# Patient Record
Sex: MALE | Race: ASIAN | NOT HISPANIC OR LATINO | URBAN - METROPOLITAN AREA
[De-identification: names, ages, dates, MRNs, and addresses within clinical notes are randomized per-mention and may not be internally consistent; named-entity substitution may affect disease eponyms.]

---

## 2018-08-01 ENCOUNTER — INPATIENT (INPATIENT)
Facility: HOSPITAL | Age: 68
LOS: 0 days | Discharge: HOME | DRG: 313 | End: 2018-08-02
Attending: INTERNAL MEDICINE | Admitting: INTERNAL MEDICINE
Payer: MEDICARE

## 2018-08-01 VITALS
OXYGEN SATURATION: 98 % | HEART RATE: 66 BPM | DIASTOLIC BLOOD PRESSURE: 61 MMHG | SYSTOLIC BLOOD PRESSURE: 108 MMHG | RESPIRATION RATE: 19 BRPM | WEIGHT: 179.9 LBS | TEMPERATURE: 98 F | HEIGHT: 67 IN

## 2018-08-01 DIAGNOSIS — F17.200 NICOTINE DEPENDENCE, UNSPECIFIED, UNCOMPLICATED: ICD-10-CM

## 2018-08-01 DIAGNOSIS — I24.9 ACUTE ISCHEMIC HEART DISEASE, UNSPECIFIED: ICD-10-CM

## 2018-08-01 DIAGNOSIS — Z29.9 ENCOUNTER FOR PROPHYLACTIC MEASURES, UNSPECIFIED: ICD-10-CM

## 2018-08-01 DIAGNOSIS — E11.9 TYPE 2 DIABETES MELLITUS WITHOUT COMPLICATIONS: ICD-10-CM

## 2018-08-01 DIAGNOSIS — I10 ESSENTIAL (PRIMARY) HYPERTENSION: ICD-10-CM

## 2018-08-01 LAB
ALBUMIN SERPL ELPH-MCNC: 3.9 G/DL — SIGNIFICANT CHANGE UP (ref 3.5–5)
ALP SERPL-CCNC: 68 U/L — SIGNIFICANT CHANGE UP (ref 40–120)
ALT FLD-CCNC: 59 U/L DA — SIGNIFICANT CHANGE UP (ref 10–60)
ANION GAP SERPL CALC-SCNC: 9 MMOL/L — SIGNIFICANT CHANGE UP (ref 5–17)
APTT BLD: 27.2 SEC — LOW (ref 27.5–37.4)
AST SERPL-CCNC: 30 U/L — SIGNIFICANT CHANGE UP (ref 10–40)
BASOPHILS # BLD AUTO: 0.1 K/UL — SIGNIFICANT CHANGE UP (ref 0–0.2)
BASOPHILS NFR BLD AUTO: 1 % — SIGNIFICANT CHANGE UP (ref 0–2)
BILIRUB SERPL-MCNC: 0.4 MG/DL — SIGNIFICANT CHANGE UP (ref 0.2–1.2)
BUN SERPL-MCNC: 13 MG/DL — SIGNIFICANT CHANGE UP (ref 7–18)
CALCIUM SERPL-MCNC: 8.8 MG/DL — SIGNIFICANT CHANGE UP (ref 8.4–10.5)
CHLORIDE SERPL-SCNC: 106 MMOL/L — SIGNIFICANT CHANGE UP (ref 96–108)
CK MB BLD-MCNC: 1 % — SIGNIFICANT CHANGE UP (ref 0–3.5)
CK MB BLD-MCNC: <1 % — SIGNIFICANT CHANGE UP (ref 0–3.5)
CK MB CFR SERPL CALC: 1.1 NG/ML — SIGNIFICANT CHANGE UP (ref 0–3.6)
CK MB CFR SERPL CALC: <1 NG/ML — SIGNIFICANT CHANGE UP (ref 0–3.6)
CK SERPL-CCNC: 108 U/L — SIGNIFICANT CHANGE UP (ref 35–232)
CK SERPL-CCNC: 96 U/L — SIGNIFICANT CHANGE UP (ref 35–232)
CO2 SERPL-SCNC: 24 MMOL/L — SIGNIFICANT CHANGE UP (ref 22–31)
CREAT SERPL-MCNC: 0.94 MG/DL — SIGNIFICANT CHANGE UP (ref 0.5–1.3)
EOSINOPHIL # BLD AUTO: 0.1 K/UL — SIGNIFICANT CHANGE UP (ref 0–0.5)
EOSINOPHIL NFR BLD AUTO: 0.6 % — SIGNIFICANT CHANGE UP (ref 0–6)
GLUCOSE SERPL-MCNC: 176 MG/DL — HIGH (ref 70–99)
HBA1C BLD-MCNC: 6.7 % — HIGH (ref 4–5.6)
HCT VFR BLD CALC: 45.6 % — SIGNIFICANT CHANGE UP (ref 39–50)
HGB BLD-MCNC: 15.4 G/DL — SIGNIFICANT CHANGE UP (ref 13–17)
INR BLD: 0.97 RATIO — SIGNIFICANT CHANGE UP (ref 0.88–1.16)
LYMPHOCYTES # BLD AUTO: 2.7 K/UL — SIGNIFICANT CHANGE UP (ref 1–3.3)
LYMPHOCYTES # BLD AUTO: 29.7 % — SIGNIFICANT CHANGE UP (ref 13–44)
MCHC RBC-ENTMCNC: 33.4 PG — SIGNIFICANT CHANGE UP (ref 27–34)
MCHC RBC-ENTMCNC: 33.6 GM/DL — SIGNIFICANT CHANGE UP (ref 32–36)
MCV RBC AUTO: 99.2 FL — SIGNIFICANT CHANGE UP (ref 80–100)
MONOCYTES # BLD AUTO: 0.6 K/UL — SIGNIFICANT CHANGE UP (ref 0–0.9)
MONOCYTES NFR BLD AUTO: 6.3 % — SIGNIFICANT CHANGE UP (ref 2–14)
NEUTROPHILS # BLD AUTO: 5.7 K/UL — SIGNIFICANT CHANGE UP (ref 1.8–7.4)
NEUTROPHILS NFR BLD AUTO: 62.4 % — SIGNIFICANT CHANGE UP (ref 43–77)
PLATELET # BLD AUTO: 187 K/UL — SIGNIFICANT CHANGE UP (ref 150–400)
POTASSIUM SERPL-MCNC: 3.7 MMOL/L — SIGNIFICANT CHANGE UP (ref 3.5–5.3)
POTASSIUM SERPL-SCNC: 3.7 MMOL/L — SIGNIFICANT CHANGE UP (ref 3.5–5.3)
PROT SERPL-MCNC: 7.4 G/DL — SIGNIFICANT CHANGE UP (ref 6–8.3)
PROTHROM AB SERPL-ACNC: 10.6 SEC — SIGNIFICANT CHANGE UP (ref 9.8–12.7)
RBC # BLD: 4.6 M/UL — SIGNIFICANT CHANGE UP (ref 4.2–5.8)
RBC # FLD: 12 % — SIGNIFICANT CHANGE UP (ref 10.3–14.5)
SODIUM SERPL-SCNC: 139 MMOL/L — SIGNIFICANT CHANGE UP (ref 135–145)
TROPONIN I SERPL-MCNC: <0.015 NG/ML — SIGNIFICANT CHANGE UP (ref 0–0.04)
WBC # BLD: 9.1 K/UL — SIGNIFICANT CHANGE UP (ref 3.8–10.5)
WBC # FLD AUTO: 9.1 K/UL — SIGNIFICANT CHANGE UP (ref 3.8–10.5)

## 2018-08-01 PROCEDURE — 71045 X-RAY EXAM CHEST 1 VIEW: CPT | Mod: 26

## 2018-08-01 PROCEDURE — 99285 EMERGENCY DEPT VISIT HI MDM: CPT | Mod: 25

## 2018-08-01 RX ORDER — LISINOPRIL 2.5 MG/1
0 TABLET ORAL
Qty: 0 | Refills: 0 | COMMUNITY

## 2018-08-01 RX ORDER — ASPIRIN/CALCIUM CARB/MAGNESIUM 324 MG
81 TABLET ORAL DAILY
Qty: 0 | Refills: 0 | Status: DISCONTINUED | OUTPATIENT
Start: 2018-08-01 | End: 2018-08-02

## 2018-08-01 RX ORDER — INSULIN LISPRO 100/ML
VIAL (ML) SUBCUTANEOUS
Qty: 0 | Refills: 0 | Status: DISCONTINUED | OUTPATIENT
Start: 2018-08-01 | End: 2018-08-02

## 2018-08-01 RX ORDER — SODIUM CHLORIDE 9 MG/ML
3 INJECTION INTRAMUSCULAR; INTRAVENOUS; SUBCUTANEOUS ONCE
Qty: 0 | Refills: 0 | Status: COMPLETED | OUTPATIENT
Start: 2018-08-01 | End: 2018-08-01

## 2018-08-01 RX ORDER — HEPARIN SODIUM 5000 [USP'U]/ML
5000 INJECTION INTRAVENOUS; SUBCUTANEOUS EVERY 12 HOURS
Qty: 0 | Refills: 0 | Status: DISCONTINUED | OUTPATIENT
Start: 2018-08-01 | End: 2018-08-02

## 2018-08-01 RX ORDER — METFORMIN HYDROCHLORIDE 850 MG/1
0 TABLET ORAL
Qty: 0 | Refills: 0 | COMMUNITY

## 2018-08-01 RX ORDER — SIMVASTATIN 20 MG/1
0 TABLET, FILM COATED ORAL
Qty: 0 | Refills: 0 | COMMUNITY

## 2018-08-01 RX ORDER — PANTOPRAZOLE SODIUM 20 MG/1
40 TABLET, DELAYED RELEASE ORAL
Qty: 0 | Refills: 0 | Status: DISCONTINUED | OUTPATIENT
Start: 2018-08-01 | End: 2018-08-02

## 2018-08-01 RX ORDER — METOPROLOL TARTRATE 50 MG
12.5 TABLET ORAL
Qty: 0 | Refills: 0 | Status: DISCONTINUED | OUTPATIENT
Start: 2018-08-01 | End: 2018-08-02

## 2018-08-01 RX ORDER — ATORVASTATIN CALCIUM 80 MG/1
40 TABLET, FILM COATED ORAL AT BEDTIME
Qty: 0 | Refills: 0 | Status: DISCONTINUED | OUTPATIENT
Start: 2018-08-01 | End: 2018-08-02

## 2018-08-01 RX ORDER — ASPIRIN/CALCIUM CARB/MAGNESIUM 324 MG
0 TABLET ORAL
Qty: 0 | Refills: 0 | COMMUNITY

## 2018-08-01 RX ADMIN — ATORVASTATIN CALCIUM 40 MILLIGRAM(S): 80 TABLET, FILM COATED ORAL at 21:55

## 2018-08-01 RX ADMIN — PANTOPRAZOLE SODIUM 40 MILLIGRAM(S): 20 TABLET, DELAYED RELEASE ORAL at 17:03

## 2018-08-01 RX ADMIN — HEPARIN SODIUM 5000 UNIT(S): 5000 INJECTION INTRAVENOUS; SUBCUTANEOUS at 17:03

## 2018-08-01 RX ADMIN — Medication 12.5 MILLIGRAM(S): at 17:03

## 2018-08-01 RX ADMIN — Medication 81 MILLIGRAM(S): at 11:20

## 2018-08-01 RX ADMIN — Medication 1: at 09:53

## 2018-08-01 RX ADMIN — SODIUM CHLORIDE 3 MILLILITER(S): 9 INJECTION INTRAMUSCULAR; INTRAVENOUS; SUBCUTANEOUS at 05:01

## 2018-08-01 RX ADMIN — Medication 1: at 12:47

## 2018-08-01 NOTE — CONSULT NOTE ADULT - ASSESSMENT
68 yr old male with hx of MI,DM,HTN,Lipid D/O who presents to ER with chest pain .  1.Tele monitoring.  2.Echocardiogram.  3.Stress test-R/O ischemia.  4.Cont asa,statin,add low dose b blocker.  5.DM-Insulin.  6.GI and DVT prophylaxis.

## 2018-08-01 NOTE — ED ADULT NURSE NOTE - NSIMPLEMENTINTERV_GEN_ALL_ED
Implemented All Universal Safety Interventions:  [place] to call system. Call bell, personal items and telephone within reach. Instruct patient to call for assistance. Room bathroom lighting operational. Non-slip footwear when patient is off stretcher. Physically safe environment: no spills, clutter or unnecessary equipment. Stretcher in lowest position, wheels locked, appropriate side rails in place.

## 2018-08-01 NOTE — H&P ADULT - ASSESSMENT
68M active smoker with PHM of  MI, DM, HTN D/O who presents with chest pain which woke him up last night- he describes pain as 7-8/10 in intensity, not able to describe type of pain, associated with having trouble breathing due to pain. He took aspirin at home which did not help much in pain so son called EMS. Nitrox2 was given in ambulance which relived pain. Patient reported that he is having cough with occasional whitish sputum for last 4-5 months and pain gets worse with coughing. Denies fever, chills, PND, diaphoresis, VILLALPANDO or pain on exertion.     In ED:  CEx1 negative   vitals stable  Labs: WNL   CXR: no acute pathology noted     Unable to verify list of home medications - please verify wtih family.

## 2018-08-01 NOTE — H&P ADULT - NSHPSOCIALHISTORY_GEN_ALL_CORE
lives with family - independent at baseline   denies use of alcohol and recreational drugs  smoker 1-2 cigarettes/day for last 40 years   retired - worked in electrical company

## 2018-08-01 NOTE — CONSULT NOTE ADULT - SUBJECTIVE AND OBJECTIVE BOX
CHIEF COMPLAINT:Patient is a 68y old  Male who presents with a chief complaint of CP.    HPI:68 yr old male with hx of MI,DM,HTN,Lipid D/O who presents to ER with chest pain which awakened him from sleep, no associated sob or diaphoresis. He denies any chest pain with exertion and currently is pain free.      PAST MEDICAL & SURGICAL HISTORY:  HTN (hypertension)  Myocardial infarct  Diabetes  Lipid D/O      MEDICATIONS  (STANDING):  aspirin  chewable 81 milliGRAM(s) Oral daily  atorvastatin 40 milliGRAM(s) Oral at bedtime  insulin lispro (HumaLOG) corrective regimen sliding scale   SubCutaneous three times a day before meals    MEDICATIONS  (PRN):      FAMILY HISTORY:Non-contributory      SOCIAL HISTORY:    [x ] Non-smoker    [x ] Alcohol-denies    Allergies    No Known Allergies    Intolerances    	    REVIEW OF SYSTEMS:  CONSTITUTIONAL: No fever, weight loss, or fatigue  EYES: No eye pain, visual disturbances, or discharge  ENT:  No difficulty hearing, tinnitus, vertigo; No sinus or throat pain  NECK: No pain or stiffness  RESPIRATORY: No cough, wheezing, chills or hemoptysis; No Shortness of Breath  CARDIOVASCULAR: + chest pain, No palpitations, passing out, dizziness, or leg swelling  GASTROINTESTINAL: No abdominal or epigastric pain. No nausea, vomiting, or hematemesis; No diarrhea or constipation. No melena or hematochezia.  GENITOURINARY: No dysuria, frequency, hematuria, or incontinence  NEUROLOGICAL: No headaches, memory loss, loss of strength, numbness, or tremors  SKIN: No itching, burning, rashes, or lesions   LYMPH Nodes: No enlarged glands  ENDOCRINE: No heat or cold intolerance; No hair loss  MUSCULOSKELETAL: No joint pain or swelling; No muscle, back, or extremity pain  PSYCHIATRIC: No depression, anxiety, mood swings, or difficulty sleeping  HEME/LYMPH: No easy bruising, or bleeding gums  ALLERGY AND IMMUNOLOGIC: No hives or eczema	      PHYSICAL EXAM:  T(C): 36.4 (08-01-18 @ 11:44), Max: 36.7 (08-01-18 @ 04:20)  HR: 66 (08-01-18 @ 11:44) (56 - 72)  BP: 122/69 (08-01-18 @ 11:44) (106/75 - 122/69)  RR: 18 (08-01-18 @ 11:44) (18 - 19)  SpO2: 97% (08-01-18 @ 11:44) (97% - 99%)  Wt(kg): --  I&O's Summary      Appearance: Normal	  HEENT:   Normal oral mucosa, PERRL, EOMI	  Lymphatic: No lymphadenopathy  Cardiovascular: Normal S1 S2, No JVD, No murmurs, No edema  Respiratory: Lungs clear to auscultation	  Psychiatry: A & O x 3, Mood & affect appropriate  Gastrointestinal:  Soft, Non-tender, + BS	  Skin: No rashes, No ecchymoses, No cyanosis	  Neurologic: Non-focal  Extremities: Normal range of motion, No clubbing, cyanosis or edema  Vascular: Peripheral pulses palpable 2+ bilaterally       ECG:  	nsr with non-specific st-t changes    	  LABS:	 	    CARDIAC MARKERS:  CARDIAC MARKERS ( 01 Aug 2018 11:36 )  <0.015 ng/mL / x     / 108 U/L / x     / 1.1 ng/mL  CARDIAC MARKERS ( 01 Aug 2018 04:50 )  <0.015 ng/mL / x     / x     / x     / x                            15.4   9.1   )-----------( 187      ( 01 Aug 2018 04:50 )             45.6     08-01    139  |  106  |  13  ----------------------------<  176<H>  3.7   |  24  |  0.94    Ca    8.8      01 Aug 2018 04:50    TPro  7.4  /  Alb  3.9  /  TBili  0.4  /  DBili  x   /  AST  30  /  ALT  59  /  AlkPhos  68  08-01  EXAM:  XR CHEST PORTABLE IMMED 1V                            PROCEDURE DATE:  08/01/2018          INTERPRETATION:  CLINICAL STATEMENT: Chest Pain.    TECHNIQUE: AP view of the chest.    COMPARISON: None available.    FINDINGS/  IMPRESSION:  Nonspecific mild increased interstitial lung markings lung bases. No   gross consolidation or pleural effusion.    Heart size cannot be accurately assessed in this projection.

## 2018-08-01 NOTE — H&P ADULT - PROBLEM SELECTOR PLAN 1
will rule out ACS given risk factors : HTN, DM and active smoking   HEART score of 4 with moderate risk and ANDRIA score 3 with 14% risk of all cause mortality   CEx2 negative will follow 3rd set   follow ECHO   stress test as per cardio recommendations   started on aspirin, statin and metoprolol as per ACS protocol   tele monitoring

## 2018-08-01 NOTE — H&P ADULT - PROBLEM SELECTOR PLAN 5
IMPROVE VTE Individual Risk Assessment          RISK                                                          Points  [  ] Previous VTE                                                3  [  ] Thrombophilia                                             2  [  ] Lower limb paralysis                                   2        (unable to hold up >15 seconds)    [  ] Current Cancer                                             2         (within 6 months)  [ x ] Immobilization > 24 hrs                              1  [  ] ICU/CCU stay > 24 hours                             1  [ x ] Age > 60                                                         1    IMPROVE VTE Score: 2  will start on heparin subcu

## 2018-08-01 NOTE — ED PROVIDER NOTE - MEDICAL DECISION MAKING DETAILS
Cardiac risk factors: Age, HTN, DM, dyslipidemia, +tob. MAR endorsed. Pt agrees with admission for cardiac work up/monitoring. I had a detailed discussion with the patient and/or guardian regarding the historical points, exam findings, and any diagnostic results supporting the admit diagnosis.

## 2018-08-01 NOTE — H&P ADULT - NSHPLABSRESULTS_GEN_ALL_CORE
Vital Signs Last 24 Hrs  T(C): 36.4 (01 Aug 2018 11:44), Max: 36.7 (01 Aug 2018 04:20)  T(F): 97.6 (01 Aug 2018 11:44), Max: 98.1 (01 Aug 2018 05:02)  HR: 66 (01 Aug 2018 11:44) (56 - 72)  BP: 122/69 (01 Aug 2018 11:44) (106/75 - 122/69)  RR: 18 (01 Aug 2018 11:44) (18 - 19)  SpO2: 97% (01 Aug 2018 11:44) (97% - 99%)                            15.4   9.1   )-----------( 187      ( 01 Aug 2018 04:50 )             45.6       08-01    139  |  106  |  13  ----------------------------<  176<H>  3.7   |  24  |  0.94    Ca    8.8      01 Aug 2018 04:50    TPro  7.4  /  Alb  3.9  /  TBili  0.4  /  DBili  x   /  AST  30  /  ALT  59  /  AlkPhos  68  08-01                  PT/INR - ( 01 Aug 2018 04:50 )   PT: 10.6 sec;   INR: 0.97 ratio         PTT - ( 01 Aug 2018 04:50 )  PTT:27.2 sec    Lactate Trend      CARDIAC MARKERS ( 01 Aug 2018 11:36 )  <0.015 ng/mL / x     / 108 U/L / x     / 1.1 ng/mL  CARDIAC MARKERS ( 01 Aug 2018 04:50 )  <0.015 ng/mL / x     / x     / x     / x            CAPILLARY BLOOD GLUCOSE      POCT Blood Glucose.: 195 mg/dL (01 Aug 2018 12:03)        < from: Xray Chest 1 View-PORTABLE IMMEDIATE (08.01.18 @ 04:38) >    FINDINGS/  IMPRESSION:  Nonspecific mild increased interstitial lung markings lung bases. No   gross consolidation or pleural effusion.    Heart size cannot be accurately assessed in this projection.    < end of copied text >

## 2018-08-01 NOTE — ED PROVIDER NOTE - OBJECTIVE STATEMENT
Pt declines translation services, is opting for Mandarin translation by son, at bedside.     67 y/o M with PMHx of DM, HTN, BIB EMS to ED w/ c/o chest pain x 0315 this morning, that was preventing pt from sleeping. Pt took an aspirin at home before calling EMS. Pt had active chest pain on the field that was resolved with 2 sublingual nitroglycerin tabs given by EMS. Pt admits to being a smoker. Denies any shortness of breath, cough, fever, nausea, vomiting, or any other complaints. Notable FHx in both mother and father with heart attacks in their 70s. PMD: Dr. Klever Mayo. NKDA. Pt declines translation services, is opting for Mandarin translation by adult son, at bedside.     67 y/o M with PMHx of DM, HTN, BIB EMS to ED w/ c/o chest pain x 0315 this morning, that was preventing pt from sleeping. Pt took an aspirin at home before calling EMS. Pt had active chest pain on the field that was resolved with 2 sublingual nitroglycerin tabs given by EMS. Pt admits to being a smoker. Denies any shortness of breath, cough, fever, nausea, vomiting, or any other complaints. Notable FHx in both mother and father with heart attacks in their 70s. PMD: Dr. Klever Mayo. NKDA.

## 2018-08-01 NOTE — H&P ADULT - HISTORY OF PRESENT ILLNESS
68M active smoker with PHM of  MI, DM, HTN D/O who presents with chest pain which woke him up last night- he describes pain as 7-8/10 in intensity, not able to describe type of pain, associated with having trouble breathing due to pain. He took aspirin at home which did not help much in pain so son called EMS. Nitrox2 was given in ambulance which relived pain. Patient reported that he is having cough with occasional whitish sputum for last 4-5 months and pain gets worse with coughing. Denies fever, chills, PND, diaphoresis, VILLALPANDO or pain on exertion.

## 2018-08-01 NOTE — H&P ADULT - NSHPPHYSICALEXAM_GEN_ALL_CORE
· Constitutional	Well-developed, well nourished  · Eyes	EOMI; PERRL; no drainage or redness  · ENMT	No oral lesions; no gross abnormalities  · Neck	No bruits; no thyromegaly or nodules   · Back	No deformity or limitation of movement   · Respiratory	Breath Sounds equal & clear to percussion & auscultation, no accessory muscle use  · Cardiovascular	Regular rate & rhythm, normal S1, S2; no murmurs, gallops or rubs; no S3, S4  · Gastrointestinal	Soft, non-tender, no hepatosplenomegaly, normal bowel sounds  · Extremities	No cyanosis, clubbing or edema   · Neurological	Alert & oriented; no sensory, motor or coordination deficits, normal reflexes  · Musculoskeletal	No joint pain, swelling or deformity; no limitation of movement

## 2018-08-02 VITALS
HEART RATE: 61 BPM | SYSTOLIC BLOOD PRESSURE: 153 MMHG | RESPIRATION RATE: 18 BRPM | TEMPERATURE: 97 F | DIASTOLIC BLOOD PRESSURE: 75 MMHG | OXYGEN SATURATION: 96 %

## 2018-08-02 LAB
24R-OH-CALCIDIOL SERPL-MCNC: 21.8 NG/ML — LOW (ref 30–80)
ANION GAP SERPL CALC-SCNC: 10 MMOL/L — SIGNIFICANT CHANGE UP (ref 5–17)
BASOPHILS # BLD AUTO: 0.1 K/UL — SIGNIFICANT CHANGE UP (ref 0–0.2)
BASOPHILS NFR BLD AUTO: 1.5 % — SIGNIFICANT CHANGE UP (ref 0–2)
BUN SERPL-MCNC: 15 MG/DL — SIGNIFICANT CHANGE UP (ref 7–18)
CALCIUM SERPL-MCNC: 8.5 MG/DL — SIGNIFICANT CHANGE UP (ref 8.4–10.5)
CHLORIDE SERPL-SCNC: 106 MMOL/L — SIGNIFICANT CHANGE UP (ref 96–108)
CHOLEST SERPL-MCNC: 156 MG/DL — SIGNIFICANT CHANGE UP (ref 10–199)
CO2 SERPL-SCNC: 23 MMOL/L — SIGNIFICANT CHANGE UP (ref 22–31)
CREAT SERPL-MCNC: 0.89 MG/DL — SIGNIFICANT CHANGE UP (ref 0.5–1.3)
EOSINOPHIL # BLD AUTO: 0.1 K/UL — SIGNIFICANT CHANGE UP (ref 0–0.5)
EOSINOPHIL NFR BLD AUTO: 1.4 % — SIGNIFICANT CHANGE UP (ref 0–6)
FOLATE SERPL-MCNC: 14.5 NG/ML — SIGNIFICANT CHANGE UP
GLUCOSE SERPL-MCNC: 150 MG/DL — HIGH (ref 70–99)
HBA1C BLD-MCNC: 6.7 % — HIGH (ref 4–5.6)
HCT VFR BLD CALC: 45.7 % — SIGNIFICANT CHANGE UP (ref 39–50)
HDLC SERPL-MCNC: 39 MG/DL — LOW (ref 40–125)
HGB BLD-MCNC: 15.2 G/DL — SIGNIFICANT CHANGE UP (ref 13–17)
LIPID PNL WITH DIRECT LDL SERPL: 81 MG/DL — SIGNIFICANT CHANGE UP
LYMPHOCYTES # BLD AUTO: 2.3 K/UL — SIGNIFICANT CHANGE UP (ref 1–3.3)
LYMPHOCYTES # BLD AUTO: 42.9 % — SIGNIFICANT CHANGE UP (ref 13–44)
MAGNESIUM SERPL-MCNC: 2.2 MG/DL — SIGNIFICANT CHANGE UP (ref 1.6–2.6)
MCHC RBC-ENTMCNC: 33.4 GM/DL — SIGNIFICANT CHANGE UP (ref 32–36)
MCHC RBC-ENTMCNC: 33.7 PG — SIGNIFICANT CHANGE UP (ref 27–34)
MCV RBC AUTO: 101 FL — HIGH (ref 80–100)
MONOCYTES # BLD AUTO: 0.5 K/UL — SIGNIFICANT CHANGE UP (ref 0–0.9)
MONOCYTES NFR BLD AUTO: 9.3 % — SIGNIFICANT CHANGE UP (ref 2–14)
NEUTROPHILS # BLD AUTO: 2.4 K/UL — SIGNIFICANT CHANGE UP (ref 1.8–7.4)
NEUTROPHILS NFR BLD AUTO: 44.9 % — SIGNIFICANT CHANGE UP (ref 43–77)
PHOSPHATE SERPL-MCNC: 3.4 MG/DL — SIGNIFICANT CHANGE UP (ref 2.5–4.5)
PLATELET # BLD AUTO: 182 K/UL — SIGNIFICANT CHANGE UP (ref 150–400)
POTASSIUM SERPL-MCNC: 3.6 MMOL/L — SIGNIFICANT CHANGE UP (ref 3.5–5.3)
POTASSIUM SERPL-SCNC: 3.6 MMOL/L — SIGNIFICANT CHANGE UP (ref 3.5–5.3)
RBC # BLD: 4.53 M/UL — SIGNIFICANT CHANGE UP (ref 4.2–5.8)
RBC # FLD: 12.2 % — SIGNIFICANT CHANGE UP (ref 10.3–14.5)
SODIUM SERPL-SCNC: 139 MMOL/L — SIGNIFICANT CHANGE UP (ref 135–145)
TOTAL CHOLESTEROL/HDL RATIO MEASUREMENT: 4 RATIO — SIGNIFICANT CHANGE UP (ref 3.4–9.6)
TRIGL SERPL-MCNC: 182 MG/DL — HIGH (ref 10–149)
TSH SERPL-MCNC: 2.49 UU/ML — SIGNIFICANT CHANGE UP (ref 0.34–4.82)
VIT B12 SERPL-MCNC: 597 PG/ML — SIGNIFICANT CHANGE UP (ref 232–1245)
WBC # BLD: 5.4 K/UL — SIGNIFICANT CHANGE UP (ref 3.8–10.5)
WBC # FLD AUTO: 5.4 K/UL — SIGNIFICANT CHANGE UP (ref 3.8–10.5)

## 2018-08-02 PROCEDURE — 83735 ASSAY OF MAGNESIUM: CPT

## 2018-08-02 PROCEDURE — 82746 ASSAY OF FOLIC ACID SERUM: CPT

## 2018-08-02 PROCEDURE — 80048 BASIC METABOLIC PNL TOTAL CA: CPT

## 2018-08-02 PROCEDURE — 93306 TTE W/DOPPLER COMPLETE: CPT

## 2018-08-02 PROCEDURE — 84484 ASSAY OF TROPONIN QUANT: CPT

## 2018-08-02 PROCEDURE — 85027 COMPLETE CBC AUTOMATED: CPT

## 2018-08-02 PROCEDURE — 85610 PROTHROMBIN TIME: CPT

## 2018-08-02 PROCEDURE — 82306 VITAMIN D 25 HYDROXY: CPT

## 2018-08-02 PROCEDURE — 93306 TTE W/DOPPLER COMPLETE: CPT | Mod: 26

## 2018-08-02 PROCEDURE — A9502: CPT

## 2018-08-02 PROCEDURE — 83036 HEMOGLOBIN GLYCOSYLATED A1C: CPT

## 2018-08-02 PROCEDURE — 84443 ASSAY THYROID STIM HORMONE: CPT

## 2018-08-02 PROCEDURE — 93005 ELECTROCARDIOGRAM TRACING: CPT

## 2018-08-02 PROCEDURE — 82553 CREATINE MB FRACTION: CPT

## 2018-08-02 PROCEDURE — 85730 THROMBOPLASTIN TIME PARTIAL: CPT

## 2018-08-02 PROCEDURE — 93017 CV STRESS TEST TRACING ONLY: CPT

## 2018-08-02 PROCEDURE — 84100 ASSAY OF PHOSPHORUS: CPT

## 2018-08-02 PROCEDURE — 82962 GLUCOSE BLOOD TEST: CPT

## 2018-08-02 PROCEDURE — 99285 EMERGENCY DEPT VISIT HI MDM: CPT | Mod: 25

## 2018-08-02 PROCEDURE — 71045 X-RAY EXAM CHEST 1 VIEW: CPT

## 2018-08-02 PROCEDURE — 80053 COMPREHEN METABOLIC PANEL: CPT

## 2018-08-02 PROCEDURE — 82607 VITAMIN B-12: CPT

## 2018-08-02 PROCEDURE — 82550 ASSAY OF CK (CPK): CPT

## 2018-08-02 PROCEDURE — 78452 HT MUSCLE IMAGE SPECT MULT: CPT

## 2018-08-02 PROCEDURE — 80061 LIPID PANEL: CPT

## 2018-08-02 RX ORDER — PANTOPRAZOLE SODIUM 20 MG/1
1 TABLET, DELAYED RELEASE ORAL
Qty: 14 | Refills: 0 | OUTPATIENT
Start: 2018-08-02 | End: 2018-08-15

## 2018-08-02 RX ADMIN — Medication 2: at 12:17

## 2018-08-02 RX ADMIN — Medication 81 MILLIGRAM(S): at 12:17

## 2018-08-02 RX ADMIN — PANTOPRAZOLE SODIUM 40 MILLIGRAM(S): 20 TABLET, DELAYED RELEASE ORAL at 05:18

## 2018-08-02 RX ADMIN — HEPARIN SODIUM 5000 UNIT(S): 5000 INJECTION INTRAVENOUS; SUBCUTANEOUS at 05:18

## 2018-08-02 NOTE — PROGRESS NOTE ADULT - SUBJECTIVE AND OBJECTIVE BOX
CHIEF COMPLAINT:Patient is a 68y old  Male who presents with a chief complaint of chest pain for 1 day.Pt appears comfortable.    	  REVIEW OF SYSTEMS:  CONSTITUTIONAL: No fever, weight loss, or fatigue  EYES: No eye pain, visual disturbances, or discharge  ENT:  No difficulty hearing, tinnitus, vertigo; No sinus or throat pain  NECK: No pain or stiffness  RESPIRATORY: No cough, wheezing, chills or hemoptysis; No Shortness of Breath  CARDIOVASCULAR: No chest pain, palpitations, passing out, dizziness, or leg swelling  GASTROINTESTINAL: No abdominal or epigastric pain. No nausea, vomiting, or hematemesis; No diarrhea or constipation. No melena or hematochezia.  GENITOURINARY: No dysuria, frequency, hematuria, or incontinence  NEUROLOGICAL: No headaches, memory loss, loss of strength, numbness, or tremors  SKIN: No itching, burning, rashes, or lesions   LYMPH Nodes: No enlarged glands  ENDOCRINE: No heat or cold intolerance; No hair loss  MUSCULOSKELETAL: No joint pain or swelling; No muscle, back, or extremity pain  PSYCHIATRIC: No depression, anxiety, mood swings, or difficulty sleeping  HEME/LYMPH: No easy bruising, or bleeding gums  ALLERGY AND IMMUNOLOGIC: No hives or eczema	    PHYSICAL EXAM:  T(C): 36.6 (08-02-18 @ 07:33), Max: 36.9 (08-02-18 @ 05:58)  HR: 51 (08-02-18 @ 07:33) (51 - 66)  BP: 118/71 (08-02-18 @ 07:33) (105/57 - 135/70)  RR: 16 (08-02-18 @ 07:33) (16 - 18)  SpO2: 96% (08-02-18 @ 07:33) (95% - 99%)    Appearance: Normal	  HEENT:   Normal oral mucosa, PERRL, EOMI	  Lymphatic: No lymphadenopathy  Cardiovascular: Normal S1 S2, No JVD, No murmurs, No edema  Respiratory: Lungs clear to auscultation	  Psychiatry: A & O x 3, Mood & affect appropriate  Gastrointestinal:  Soft, Non-tender, + BS	  Skin: No rashes, No ecchymoses, No cyanosis	  Neurologic: Non-focal  Extremities: Normal range of motion, No clubbing, cyanosis or edema  Vascular: Peripheral pulses palpable 2+ bilaterally    MEDICATIONS  (STANDING):  aspirin  chewable 81 milliGRAM(s) Oral daily  atorvastatin 40 milliGRAM(s) Oral at bedtime  heparin  Injectable 5000 Unit(s) SubCutaneous every 12 hours  insulin lispro (HumaLOG) corrective regimen sliding scale   SubCutaneous three times a day before meals  metoprolol tartrate 12.5 milliGRAM(s) Oral two times a day  pantoprazole    Tablet 40 milliGRAM(s) Oral before breakfast      TELEMETRY: 	  nsr	  	  LABS:	 	    CARDIAC MARKERS:  CARDIAC MARKERS ( 01 Aug 2018 20:33 )  <0.015 ng/mL / x     / 96 U/L / x     / <1.0 ng/mL  CARDIAC MARKERS ( 01 Aug 2018 11:36 )  <0.015 ng/mL / x     / 108 U/L / x     / 1.1 ng/mL  CARDIAC MARKERS ( 01 Aug 2018 04:50 )  <0.015 ng/mL / x     / x     / x     / x                                    15.2   5.4   )-----------( 182      ( 02 Aug 2018 07:02 )             45.7     08-02    139  |  106  |  15  ----------------------------<  150<H>  3.6   |  23  |  0.89    Ca    8.5      02 Aug 2018 07:02  Phos  3.4     08-02  Mg     2.2     08-02    TPro  7.4  /  Alb  3.9  /  TBili  0.4  /  DBili  x   /  AST  30  /  ALT  59  /  AlkPhos  68  08-01    proBNP:   Lipid Profile: Cholesterol 156  LDL 81  HDL 39      HgA1c: Hemoglobin A1C, Whole Blood: 6.7 % (08-01 @ 18:12)    TSH: Thyroid Stimulating Hormone, Serum: 2.49 uU/mL (08-02 @ 07:02)

## 2018-08-02 NOTE — DISCHARGE NOTE ADULT - MEDICATION SUMMARY - MEDICATIONS TO TAKE
I will START or STAY ON the medications listed below when I get home from the hospital:    aspirin  -- Indication: For prophylaxis    lisinopril  -- Indication: For Hypertension    metFORMIN  -- Indication: For Diabetes    simvastatin  -- Indication: For Hyperlipidemia    pantoprazole 40 mg oral delayed release tablet  -- 1 tab(s) by mouth once a day (before a meal)  -- Indication: For GI prophylaxis

## 2018-08-02 NOTE — DISCHARGE NOTE ADULT - HOSPITAL COURSE
68M active smoker with PHM of  MI, DM, HTN D/O who presents with chest pain which woke him up last night- he describes pain as 7-8/10 in intensity, not able to describe type of pain, associated with having trouble breathing due to pain. He took aspirin at home which did not help much in pain so son called EMS. Nitrox2 was given in ambulance which relived pain. Patient reported that he is having cough with occasional whitish sputum for last 4-5 months and pain gets worse with coughing. Denies fever, chills, PND, diaphoresis, VILLALPANDO or pain on exertion.    Patient was admitted 68M active smoker with PHM of  MI, DM, HTN D/O who presents with chest pain which woke him up last night- he describes pain as 7-8/10 in intensity, not able to describe type of pain, associated with having trouble breathing due to pain. He took aspirin at home which did not help much in pain so son called EMS. Nitrox2 was given in ambulance which relived pain. Patient reported that he is having cough with occasional whitish sputum for last 4-5 months and pain gets worse with coughing. Denies fever, chills, PND, diaphoresis, VILLALPANDO or pain on exertion.    Patient was admitted to rule out ACS. EKG negative. Troponins negative. Echo negative. Stress negative. Patient reports feeling asymptomatic now. He is medically clear to be discharged.

## 2018-08-02 NOTE — DISCHARGE NOTE ADULT - CARE PLAN
Principal Discharge DX:	Acute coronary syndrome  Goal:	Rule out ACS  Assessment and plan of treatment:	You were admitted because you came to the hospital with complaint of chest pain. Your pain has since resolved. Your EKG was negative.  Secondary Diagnosis:	HTN (hypertension)  Secondary Diagnosis:	Diabetes Principal Discharge DX:	Acute coronary syndrome  Goal:	Rule out ACS  Assessment and plan of treatment:	You were admitted because you came to the hospital with complaint of chest pain. Your pain has since resolved. Your EKG was negative. Cardiac enzymes were negative. Echocardiogram and stress test were both negative. Your pain was likely of gastrointestinal origin. We recommend protonix on discharge.  Secondary Diagnosis:	HTN (hypertension)  Goal:	BP Control  Assessment and plan of treatment:	You have a history of hypertension. Continue taking your home medications as prescribed.  Secondary Diagnosis:	Diabetes  Goal:	Blood glucose control  Assessment and plan of treatment:	You have a history of diabetes. We advise you continue your home medications as prescribed.

## 2018-08-02 NOTE — DISCHARGE NOTE ADULT - PATIENT PORTAL LINK FT
You can access the [facility] Patient Portal, offered by [facility], by registering with the following website: [email or web address]

## 2023-12-22 NOTE — ED PROVIDER NOTE - NS PRO PASSIVE SMOKE EXP
FYI for [place] Triage:     No Neurology Practice in [place].  Suzy ROLON   Specialty Clinic RN   No
